# Patient Record
Sex: MALE | RURAL
[De-identification: names, ages, dates, MRNs, and addresses within clinical notes are randomized per-mention and may not be internally consistent; named-entity substitution may affect disease eponyms.]

---

## 2020-10-06 ENCOUNTER — OFFICE VISIT (OUTPATIENT)
Dept: RURAL CLINIC 4 | Facility: CLINIC | Age: 65
End: 2020-10-06
Payer: MEDICARE

## 2020-10-06 DIAGNOSIS — M10.9 GOUT: ICD-10-CM

## 2020-10-06 DIAGNOSIS — D50.9 IDA (IRON DEFICIENCY ANEMIA): ICD-10-CM

## 2020-10-06 DIAGNOSIS — D84.9 IMMUNOSUPPRESSED STATUS: ICD-10-CM

## 2020-10-06 DIAGNOSIS — E78.5 HYPERLIPIDEMIA: ICD-10-CM

## 2020-10-06 DIAGNOSIS — Z94.0 KIDNEY REPLACED BY TRANSPLANT: ICD-10-CM

## 2020-10-06 PROBLEM — 55822004 HYPERLIPIDEMIA: Status: ACTIVE | Noted: 2020-10-06

## 2020-10-06 PROBLEM — 90560007 GOUT: Status: ACTIVE | Noted: 2020-10-06

## 2020-10-06 PROBLEM — 161665007 HISTORY OF RENAL TRANSPLANT: Status: ACTIVE | Noted: 2020-10-06

## 2020-10-06 PROCEDURE — G8417 CALC BMI ABV UP PARAM F/U: HCPCS | Performed by: INTERNAL MEDICINE

## 2020-10-06 PROCEDURE — G8484 FLU IMMUNIZE NO ADMIN: HCPCS | Performed by: INTERNAL MEDICINE

## 2020-10-06 PROCEDURE — 43235 EGD DIAGNOSTIC BRUSH WASH: CPT | Performed by: INTERNAL MEDICINE

## 2020-10-06 PROCEDURE — 3017F COLORECTAL CA SCREEN DOC REV: CPT | Performed by: INTERNAL MEDICINE

## 2020-10-06 PROCEDURE — G8427 DOCREV CUR MEDS BY ELIG CLIN: HCPCS | Performed by: INTERNAL MEDICINE

## 2020-10-06 PROCEDURE — 99203 OFFICE O/P NEW LOW 30 MIN: CPT | Performed by: INTERNAL MEDICINE

## 2020-10-06 PROCEDURE — G9903 PT SCRN TBCO ID AS NON USER: HCPCS | Performed by: INTERNAL MEDICINE

## 2020-10-06 RX ORDER — PREDNISONE 5 MG/1
1 TABLET TABLET ORAL ONCE A DAY
Status: ACTIVE | COMMUNITY

## 2020-10-06 RX ORDER — BISACODYL 5 MG
TAKE 4 TABLET, DELAYED RELEASE (ENTERIC COATED) ORAL
Qty: 4 | OUTPATIENT
Start: 2020-10-06 | End: 2020-10-07

## 2020-10-06 RX ORDER — CARVEDILOL 6.25 MG/1
1 TABLET WITH FOOD TABLET, FILM COATED ORAL TWICE A DAY
Status: ACTIVE | COMMUNITY

## 2020-10-06 RX ORDER — FEBUXOSTAT 40 MG/1
1 TABLET TABLET ORAL ONCE A DAY
Status: ACTIVE | COMMUNITY

## 2020-10-06 RX ORDER — SODIUM, POTASSIUM,MAG SULFATES 17.5-3.13G
177 ML SOLUTION, RECONSTITUTED, ORAL ORAL
Qty: 1 KIT | Refills: 0 | OUTPATIENT
Start: 2020-10-06

## 2020-10-06 RX ORDER — FEBUXOSTAT 40 MG/1
1 TABLET TABLET ORAL ONCE A DAY
Status: DISCONTINUED | COMMUNITY

## 2020-10-06 RX ORDER — ASPIRIN 81 MG/1
1 TABLET TABLET, CHEWABLE ORAL ONCE A DAY
Status: ACTIVE | COMMUNITY

## 2020-10-06 RX ORDER — LOVASTATIN 20 MG/1
1 TABLET WITH THE EVENING MEAL TABLET ORAL ONCE A DAY
Status: ACTIVE | COMMUNITY

## 2020-10-06 RX ORDER — UBIDECARENONE 30 MG
1 TABLET WITH A MEAL CAPSULE ORAL ONCE A DAY
Status: ACTIVE | COMMUNITY

## 2020-10-06 RX ORDER — CHLORTHALIDONE 25 MG/1
1 TABLET IN THE MORNING WITH FOOD TABLET ORAL ONCE A DAY
Status: ACTIVE | COMMUNITY

## 2020-10-06 RX ORDER — MELATONIN 5 MG
1 TABLET IN THE EVENING TABLET ORAL ONCE A DAY
Status: ACTIVE | COMMUNITY

## 2020-10-06 RX ORDER — LISINOPRIL 10 MG/1
1 TABLET TABLET ORAL ONCE A DAY
Status: ACTIVE | COMMUNITY

## 2020-10-06 NOTE — HPI-TODAY'S VISIT:
the patient is evaluated today in the   [place] office for iron deficiency anemia.  Laboratory from September of 2020 showed a hemoglobin and hematocrit of 12.0 and 35.5 respectively. the patient denies previous endoscopic evaluation including EGD or colonoscopy.  He never sees blood in the stool.  He reports not having any problems the constipation are diarrhea.  He does have history of kidney transplantation in 1989.

## 2020-11-10 ENCOUNTER — OFFICE VISIT (OUTPATIENT)
Dept: RURAL MEDICAL CENTER 2 | Facility: MEDICAL CENTER | Age: 65
End: 2020-11-10
Payer: MEDICARE

## 2020-11-10 DIAGNOSIS — K29.80 ACUTE DUODENITIS: ICD-10-CM

## 2020-11-10 DIAGNOSIS — D50.9 IDA (IRON DEFICIENCY ANEMIA): ICD-10-CM

## 2020-11-10 DIAGNOSIS — K21.01 GASTRO-ESOPHAGEAL REFLUX DISEASE WITH ESOPHAGITIS, WITH BLEEDING: ICD-10-CM

## 2020-11-10 DIAGNOSIS — K29.61 EROSIVE GASTRITIS WITH HEMORRHAGE: ICD-10-CM

## 2020-11-10 DIAGNOSIS — D50.8 ANEMIA, DUE TO INADEQUATE IRON INTAKE: ICD-10-CM

## 2020-11-10 PROBLEM — 87522002 IRON DEFICIENCY ANEMIA: Status: ACTIVE | Noted: 2020-10-06

## 2020-11-10 PROCEDURE — 45385 COLONOSCOPY W/LESION REMOVAL: CPT | Performed by: INTERNAL MEDICINE

## 2020-11-10 PROCEDURE — 43239 EGD BIOPSY SINGLE/MULTIPLE: CPT | Performed by: INTERNAL MEDICINE

## 2020-11-10 RX ORDER — CARVEDILOL 6.25 MG/1
1 TABLET WITH FOOD TABLET, FILM COATED ORAL TWICE A DAY
Status: ACTIVE | COMMUNITY

## 2020-11-10 RX ORDER — ASPIRIN 81 MG/1
1 TABLET TABLET, CHEWABLE ORAL ONCE A DAY
Status: ACTIVE | COMMUNITY

## 2020-11-10 RX ORDER — MELATONIN 5 MG
1 TABLET IN THE EVENING TABLET ORAL ONCE A DAY
Status: ACTIVE | COMMUNITY

## 2020-11-10 RX ORDER — FEBUXOSTAT 40 MG/1
1 TABLET TABLET ORAL ONCE A DAY
Status: ACTIVE | COMMUNITY

## 2020-11-10 RX ORDER — SODIUM, POTASSIUM,MAG SULFATES 17.5-3.13G
177 ML SOLUTION, RECONSTITUTED, ORAL ORAL
Qty: 1 KIT | Refills: 0 | Status: ACTIVE | COMMUNITY
Start: 2020-10-06

## 2020-11-10 RX ORDER — LISINOPRIL 10 MG/1
1 TABLET TABLET ORAL ONCE A DAY
Status: ACTIVE | COMMUNITY

## 2020-11-10 RX ORDER — UBIDECARENONE 30 MG
1 TABLET WITH A MEAL CAPSULE ORAL ONCE A DAY
Status: ACTIVE | COMMUNITY

## 2020-11-10 RX ORDER — LOVASTATIN 20 MG/1
1 TABLET WITH THE EVENING MEAL TABLET ORAL ONCE A DAY
Status: ACTIVE | COMMUNITY

## 2020-11-10 RX ORDER — PREDNISONE 5 MG/1
1 TABLET TABLET ORAL ONCE A DAY
Status: ACTIVE | COMMUNITY

## 2020-11-10 RX ORDER — CHLORTHALIDONE 25 MG/1
1 TABLET IN THE MORNING WITH FOOD TABLET ORAL ONCE A DAY
Status: ACTIVE | COMMUNITY

## 2020-11-10 RX ORDER — FAMOTIDINE 40 MG/1
1 TABLET TABLET, FILM COATED ORAL ONCE A DAY
Qty: 90 | Refills: 3 | OUTPATIENT
Start: 2020-11-10

## 2020-12-03 ENCOUNTER — OFFICE VISIT (OUTPATIENT)
Dept: RURAL CLINIC 1 | Facility: CLINIC | Age: 65
End: 2020-12-03
Payer: MEDICARE

## 2020-12-03 DIAGNOSIS — D50.9 ANEMIA, IRON DEFICIENCY: ICD-10-CM

## 2020-12-03 PROCEDURE — 91110 GI TRC IMG INTRAL ESOPH-ILE: CPT | Performed by: INTERNAL MEDICINE

## 2020-12-03 RX ORDER — UBIDECARENONE 30 MG
1 TABLET WITH A MEAL CAPSULE ORAL ONCE A DAY
Status: ACTIVE | COMMUNITY

## 2020-12-03 RX ORDER — CHLORTHALIDONE 25 MG/1
1 TABLET IN THE MORNING WITH FOOD TABLET ORAL ONCE A DAY
Status: ACTIVE | COMMUNITY

## 2020-12-03 RX ORDER — ASPIRIN 81 MG/1
1 TABLET TABLET, CHEWABLE ORAL ONCE A DAY
Status: ACTIVE | COMMUNITY

## 2020-12-03 RX ORDER — LOVASTATIN 20 MG/1
1 TABLET WITH THE EVENING MEAL TABLET ORAL ONCE A DAY
Status: ACTIVE | COMMUNITY

## 2020-12-03 RX ORDER — FEBUXOSTAT 40 MG/1
1 TABLET TABLET ORAL ONCE A DAY
Status: ACTIVE | COMMUNITY

## 2020-12-03 RX ORDER — CARVEDILOL 6.25 MG/1
1 TABLET WITH FOOD TABLET, FILM COATED ORAL TWICE A DAY
Status: ACTIVE | COMMUNITY

## 2020-12-03 RX ORDER — MELATONIN 5 MG
1 TABLET IN THE EVENING TABLET ORAL ONCE A DAY
Status: ACTIVE | COMMUNITY

## 2020-12-03 RX ORDER — PREDNISONE 5 MG/1
1 TABLET TABLET ORAL ONCE A DAY
Status: ACTIVE | COMMUNITY

## 2020-12-03 RX ORDER — SODIUM, POTASSIUM,MAG SULFATES 17.5-3.13G
177 ML SOLUTION, RECONSTITUTED, ORAL ORAL
Qty: 1 KIT | Refills: 0 | Status: ACTIVE | COMMUNITY
Start: 2020-10-06

## 2020-12-03 RX ORDER — FAMOTIDINE 40 MG/1
1 TABLET TABLET, FILM COATED ORAL ONCE A DAY
Qty: 90 | Refills: 3 | Status: ACTIVE | COMMUNITY
Start: 2020-11-10

## 2020-12-03 RX ORDER — LISINOPRIL 10 MG/1
1 TABLET TABLET ORAL ONCE A DAY
Status: ACTIVE | COMMUNITY

## 2020-12-04 ENCOUNTER — TELEPHONE ENCOUNTER (OUTPATIENT)
Dept: URBAN - METROPOLITAN AREA CLINIC 92 | Facility: CLINIC | Age: 65
End: 2020-12-04

## 2020-12-10 ENCOUNTER — TELEPHONE ENCOUNTER (OUTPATIENT)
Dept: RURAL CLINIC 2 | Facility: CLINIC | Age: 65
End: 2020-12-10

## 2024-02-29 ENCOUNTER — LAB (OUTPATIENT)
Dept: RURAL CLINIC 4 | Facility: CLINIC | Age: 69
End: 2024-02-29

## 2024-02-29 ENCOUNTER — OV NP (OUTPATIENT)
Dept: RURAL CLINIC 4 | Facility: CLINIC | Age: 69
End: 2024-02-29

## 2024-02-29 VITALS
SYSTOLIC BLOOD PRESSURE: 132 MMHG | WEIGHT: 154 LBS | DIASTOLIC BLOOD PRESSURE: 76 MMHG | HEIGHT: 68 IN | HEART RATE: 91 BPM | TEMPERATURE: 97.3 F | BODY MASS INDEX: 23.34 KG/M2

## 2024-02-29 PROBLEM — 59621000: Status: ACTIVE | Noted: 2024-02-29

## 2024-02-29 PROBLEM — 429047008: Status: ACTIVE | Noted: 2024-02-29

## 2024-02-29 PROBLEM — 105502003: Status: ACTIVE | Noted: 2024-02-29

## 2024-02-29 PROBLEM — 236435004: Status: ACTIVE | Noted: 2024-02-29

## 2024-02-29 RX ORDER — MELATONIN 5 MG
1 TABLET IN THE EVENING TABLET ORAL ONCE A DAY
Status: ACTIVE | COMMUNITY

## 2024-02-29 RX ORDER — FAMOTIDINE 40 MG/1
1 TABLET TABLET, FILM COATED ORAL ONCE A DAY
Qty: 90 | Refills: 3 | Status: ACTIVE | COMMUNITY
Start: 2020-11-10

## 2024-02-29 RX ORDER — ASPIRIN 81 MG/1
1 TABLET TABLET, CHEWABLE ORAL ONCE A DAY
Status: ACTIVE | COMMUNITY

## 2024-02-29 RX ORDER — ATORVASTATIN CALCIUM 20 MG/1
1 TABLET TABLET, FILM COATED ORAL ONCE A DAY
Status: ACTIVE | COMMUNITY

## 2024-02-29 RX ORDER — LISINOPRIL 20 MG/1
1 TABLET TABLET ORAL ONCE A DAY
Status: ACTIVE | COMMUNITY

## 2024-02-29 RX ORDER — LOVASTATIN 20 MG/1
1 TABLET WITH THE EVENING MEAL TABLET ORAL ONCE A DAY
Status: DISCONTINUED | COMMUNITY

## 2024-02-29 RX ORDER — CARVEDILOL 6.25 MG/1
1 TABLET WITH FOOD TABLET, FILM COATED ORAL TWICE A DAY
Status: ACTIVE | COMMUNITY

## 2024-02-29 RX ORDER — FEBUXOSTAT 40 MG/1
1 TABLET TABLET ORAL ONCE A DAY
Status: ACTIVE | COMMUNITY

## 2024-02-29 RX ORDER — POLYETHYLENE GLYCOL 3350, SODIUM SULFATE ANHYDROUS, SODIUM BICARBONATE, SODIUM CHLORIDE, POTASSIUM CHLORIDE 236; 22.74; 6.74; 5.86; 2.97 G/4L; G/4L; G/4L; G/4L; G/4L
4000 ML POWDER, FOR SOLUTION ORAL 1
Qty: 1 | Refills: 0 | OUTPATIENT
Start: 2024-02-29 | End: 2024-03-01

## 2024-02-29 RX ORDER — CHLORTHALIDONE 25 MG/1
1 TABLET IN THE MORNING WITH FOOD TABLET ORAL ONCE A DAY
Status: DISCONTINUED | COMMUNITY

## 2024-02-29 RX ORDER — PREDNISONE 5 MG/1
1 TABLET TABLET ORAL ONCE A DAY
Status: ACTIVE | COMMUNITY

## 2024-02-29 RX ORDER — UBIDECARENONE 30 MG
1 TABLET WITH A MEAL CAPSULE ORAL ONCE A DAY
Status: DISCONTINUED | COMMUNITY

## 2024-02-29 RX ORDER — SODIUM, POTASSIUM,MAG SULFATES 17.5-3.13G
177 ML SOLUTION, RECONSTITUTED, ORAL ORAL
Qty: 1 KIT | Refills: 0 | Status: DISCONTINUED | COMMUNITY
Start: 2020-10-06

## 2024-02-29 RX ORDER — CLONIDINE 0.2 MG/D
1 PATCH TO SKIN PATCH TRANSDERMAL
Status: ACTIVE | COMMUNITY

## 2024-02-29 RX ORDER — LISINOPRIL 10 MG/1
1 TABLET TABLET ORAL ONCE A DAY
Status: DISCONTINUED | COMMUNITY

## 2024-02-29 RX ORDER — HYDRALAZINE HYDROCHLORIDE 50 MG/1
1 TABLET WITH FOOD TABLET, FILM COATED ORAL THREE TIMES A DAY
Status: ACTIVE | COMMUNITY

## 2024-02-29 NOTE — HPI-ZZZTODAY'S VISIT
The patient presents on recall for colonoscopy for history of colon polyps.  His previous colonoscopy was done in 2020 with findings of benign colon polyps.  He offers no complaints of abdominal pain, constipation, diarrhea or rectal bleeding.  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  He is scheduled to be evaluated for kidney transplant.

## 2024-02-29 NOTE — HPI-TODAY'S VISIT:
the patient is evaluated today in the   [place] office for iron deficiency anemia.  Laboratory from September of 2020 showed a hemoglobin and hematocrit of 12.0 and 35.5 respectively. the patient denies previous endoscopic evaluation including EGD or colonoscopy.  He never sees blood in the stool.  He reports not having any problems the constipation are diarrhea.  He does have history of kidney transplantation in 1989.

## 2024-06-25 ENCOUNTER — OFFICE VISIT (OUTPATIENT)
Dept: RURAL MEDICAL CENTER 4 | Facility: MEDICAL CENTER | Age: 69
End: 2024-06-25